# Patient Record
Sex: FEMALE | Race: BLACK OR AFRICAN AMERICAN | NOT HISPANIC OR LATINO | URBAN - METROPOLITAN AREA
[De-identification: names, ages, dates, MRNs, and addresses within clinical notes are randomized per-mention and may not be internally consistent; named-entity substitution may affect disease eponyms.]

---

## 2020-11-28 ENCOUNTER — INPATIENT (INPATIENT)
Facility: HOSPITAL | Age: 65
LOS: 5 days | Discharge: HOME | End: 2020-12-04
Attending: PSYCHIATRY & NEUROLOGY | Admitting: PSYCHIATRY & NEUROLOGY
Payer: SELF-PAY

## 2020-11-28 VITALS
SYSTOLIC BLOOD PRESSURE: 158 MMHG | TEMPERATURE: 98 F | OXYGEN SATURATION: 96 % | RESPIRATION RATE: 17 BRPM | DIASTOLIC BLOOD PRESSURE: 89 MMHG | HEART RATE: 108 BPM

## 2020-11-28 DIAGNOSIS — F29 UNSPECIFIED PSYCHOSIS NOT DUE TO A SUBSTANCE OR KNOWN PHYSIOLOGICAL CONDITION: ICD-10-CM

## 2020-11-28 LAB
ALBUMIN SERPL ELPH-MCNC: 4.1 G/DL — SIGNIFICANT CHANGE UP (ref 3.3–5)
ALP SERPL-CCNC: 103 U/L — SIGNIFICANT CHANGE UP (ref 40–120)
ALT FLD-CCNC: 14 U/L — SIGNIFICANT CHANGE UP (ref 4–33)
ANION GAP SERPL CALC-SCNC: 11 MMO/L — SIGNIFICANT CHANGE UP (ref 7–14)
APAP SERPL-MCNC: < 15 UG/ML — LOW (ref 15–25)
AST SERPL-CCNC: 26 U/L — SIGNIFICANT CHANGE UP (ref 4–32)
B PERT DNA SPEC QL NAA+PROBE: SIGNIFICANT CHANGE UP
BASOPHILS # BLD AUTO: 0.02 K/UL — SIGNIFICANT CHANGE UP (ref 0–0.2)
BASOPHILS NFR BLD AUTO: 0.4 % — SIGNIFICANT CHANGE UP (ref 0–2)
BILIRUB SERPL-MCNC: < 0.2 MG/DL — LOW (ref 0.2–1.2)
BUN SERPL-MCNC: 11 MG/DL — SIGNIFICANT CHANGE UP (ref 7–23)
C PNEUM DNA SPEC QL NAA+PROBE: SIGNIFICANT CHANGE UP
CALCIUM SERPL-MCNC: 9.3 MG/DL — SIGNIFICANT CHANGE UP (ref 8.4–10.5)
CHLORIDE SERPL-SCNC: 105 MMOL/L — SIGNIFICANT CHANGE UP (ref 98–107)
CO2 SERPL-SCNC: 25 MMOL/L — SIGNIFICANT CHANGE UP (ref 22–31)
CREAT SERPL-MCNC: 0.68 MG/DL — SIGNIFICANT CHANGE UP (ref 0.5–1.3)
EOSINOPHIL # BLD AUTO: 0.01 K/UL — SIGNIFICANT CHANGE UP (ref 0–0.5)
EOSINOPHIL NFR BLD AUTO: 0.2 % — SIGNIFICANT CHANGE UP (ref 0–6)
ETHANOL BLD-MCNC: < 10 MG/DL — SIGNIFICANT CHANGE UP
FLUAV H1 2009 PAND RNA SPEC QL NAA+PROBE: SIGNIFICANT CHANGE UP
FLUAV H1 RNA SPEC QL NAA+PROBE: SIGNIFICANT CHANGE UP
FLUAV H3 RNA SPEC QL NAA+PROBE: SIGNIFICANT CHANGE UP
FLUAV SUBTYP SPEC NAA+PROBE: SIGNIFICANT CHANGE UP
FLUBV RNA SPEC QL NAA+PROBE: SIGNIFICANT CHANGE UP
GLUCOSE SERPL-MCNC: 106 MG/DL — HIGH (ref 70–99)
HADV DNA SPEC QL NAA+PROBE: SIGNIFICANT CHANGE UP
HCOV PNL SPEC NAA+PROBE: SIGNIFICANT CHANGE UP
HCT VFR BLD CALC: 39.2 % — SIGNIFICANT CHANGE UP (ref 34.5–45)
HGB BLD-MCNC: 11.9 G/DL — SIGNIFICANT CHANGE UP (ref 11.5–15.5)
HMPV RNA SPEC QL NAA+PROBE: SIGNIFICANT CHANGE UP
HPIV1 RNA SPEC QL NAA+PROBE: SIGNIFICANT CHANGE UP
HPIV2 RNA SPEC QL NAA+PROBE: SIGNIFICANT CHANGE UP
HPIV3 RNA SPEC QL NAA+PROBE: SIGNIFICANT CHANGE UP
HPIV4 RNA SPEC QL NAA+PROBE: SIGNIFICANT CHANGE UP
IMM GRANULOCYTES NFR BLD AUTO: 0.2 % — SIGNIFICANT CHANGE UP (ref 0–1.5)
LYMPHOCYTES # BLD AUTO: 0.76 K/UL — LOW (ref 1–3.3)
LYMPHOCYTES # BLD AUTO: 14.5 % — SIGNIFICANT CHANGE UP (ref 13–44)
MCHC RBC-ENTMCNC: 26.4 PG — LOW (ref 27–34)
MCHC RBC-ENTMCNC: 30.4 % — LOW (ref 32–36)
MCV RBC AUTO: 87.1 FL — SIGNIFICANT CHANGE UP (ref 80–100)
MONOCYTES # BLD AUTO: 0.24 K/UL — SIGNIFICANT CHANGE UP (ref 0–0.9)
MONOCYTES NFR BLD AUTO: 4.6 % — SIGNIFICANT CHANGE UP (ref 2–14)
NEUTROPHILS # BLD AUTO: 4.2 K/UL — SIGNIFICANT CHANGE UP (ref 1.8–7.4)
NEUTROPHILS NFR BLD AUTO: 80.1 % — HIGH (ref 43–77)
NRBC # FLD: 0 K/UL — SIGNIFICANT CHANGE UP (ref 0–0)
PLATELET # BLD AUTO: 232 K/UL — SIGNIFICANT CHANGE UP (ref 150–400)
PMV BLD: 10.2 FL — SIGNIFICANT CHANGE UP (ref 7–13)
POTASSIUM SERPL-MCNC: 4.1 MMOL/L — SIGNIFICANT CHANGE UP (ref 3.5–5.3)
POTASSIUM SERPL-SCNC: 4.1 MMOL/L — SIGNIFICANT CHANGE UP (ref 3.5–5.3)
PROT SERPL-MCNC: 7.5 G/DL — SIGNIFICANT CHANGE UP (ref 6–8.3)
RAPID RVP RESULT: SIGNIFICANT CHANGE UP
RBC # BLD: 4.5 M/UL — SIGNIFICANT CHANGE UP (ref 3.8–5.2)
RBC # FLD: 12.5 % — SIGNIFICANT CHANGE UP (ref 10.3–14.5)
RSV RNA SPEC QL NAA+PROBE: SIGNIFICANT CHANGE UP
RV+EV RNA SPEC QL NAA+PROBE: SIGNIFICANT CHANGE UP
SALICYLATES SERPL-MCNC: < 5 MG/DL — LOW (ref 15–30)
SARS-COV-2 RNA SPEC QL NAA+PROBE: SIGNIFICANT CHANGE UP
SODIUM SERPL-SCNC: 141 MMOL/L — SIGNIFICANT CHANGE UP (ref 135–145)
TSH SERPL-MCNC: 1.89 UIU/ML — SIGNIFICANT CHANGE UP (ref 0.27–4.2)
WBC # BLD: 5.24 K/UL — SIGNIFICANT CHANGE UP (ref 3.8–10.5)
WBC # FLD AUTO: 5.24 K/UL — SIGNIFICANT CHANGE UP (ref 3.8–10.5)

## 2020-11-28 PROCEDURE — 99285 EMERGENCY DEPT VISIT HI MDM: CPT | Mod: GC

## 2020-11-28 RX ORDER — HALOPERIDOL DECANOATE 100 MG/ML
2 INJECTION INTRAMUSCULAR EVERY 6 HOURS
Refills: 0 | Status: DISCONTINUED | OUTPATIENT
Start: 2020-11-28 | End: 2020-12-04

## 2020-11-28 RX ORDER — HALOPERIDOL DECANOATE 100 MG/ML
5 INJECTION INTRAMUSCULAR ONCE
Refills: 0 | Status: COMPLETED | OUTPATIENT
Start: 2020-11-28 | End: 2020-11-28

## 2020-11-28 RX ORDER — RISPERIDONE 4 MG/1
1 TABLET ORAL AT BEDTIME
Refills: 0 | Status: DISCONTINUED | OUTPATIENT
Start: 2020-11-28 | End: 2020-12-04

## 2020-11-28 RX ORDER — HALOPERIDOL DECANOATE 100 MG/ML
2 INJECTION INTRAMUSCULAR ONCE
Refills: 0 | Status: DISCONTINUED | OUTPATIENT
Start: 2020-11-28 | End: 2020-12-04

## 2020-11-28 RX ADMIN — Medication 2 MILLIGRAM(S): at 12:26

## 2020-11-28 RX ADMIN — HALOPERIDOL DECANOATE 5 MILLIGRAM(S): 100 INJECTION INTRAMUSCULAR at 12:26

## 2020-11-28 NOTE — ED PROVIDER NOTE - PROGRESS NOTE DETAILS
spoke to brother christiano, pt has long term issues, lost a prior apartment 2' paranoid concerns of "things coming up through the floor"., making accusations of people poisoning her, was evicted through the court bc she was a danger to the family she was living with. she was hospitalized about 15 years ago at [facility] on a 72 hour hold for SI. the family has been trying to get her help outside, but her behavior has been very erratic.

## 2020-11-28 NOTE — ED BEHAVIORAL HEALTH ASSESSMENT NOTE - SUICIDE PROTECTIVE FACTORS
Fear of death or the actual act of killing self/Cultural, spiritual and/or moral attitudes against suicide/Hoahaoism beliefs

## 2020-11-28 NOTE — ED ADULT NURSE NOTE - OBJECTIVE STATEMENT
Received pt in  pt bought in by EMS / [employer]  for paranoia, pt denies si/hi/vh presently safety & comfort measures maintained eval on going.

## 2020-11-28 NOTE — ED PROVIDER NOTE - CLINICAL SUMMARY MEDICAL DECISION MAKING FREE TEXT BOX
alcides: pt presents with reported c/o suicidality in pt with reported hx of psych admission, but she denies all of this.  she is agitated in the company of nypd and ems.  we need additional collateral to help determine concerns and validity.  psych social worker and psychiatrist given phone number of neighbor.  dispo as per psych attending.

## 2020-11-28 NOTE — ED BEHAVIORAL HEALTH ASSESSMENT NOTE - DESCRIPTION
Pt irritable, paranoid, did not require emergent PRN medications.    Vital Signs Last 24 Hrs  T(C): 36.8 (28 Nov 2020 09:42), Max: 36.8 (28 Nov 2020 09:42)  T(F): 98.3 (28 Nov 2020 09:42), Max: 98.3 (28 Nov 2020 09:42)  HR: 108 (28 Nov 2020 09:42) (108 - 108)  BP: 158/89 (28 Nov 2020 09:42) (158/89 - 158/89)  BP(mean): --  RR: 17 (28 Nov 2020 09:42) (17 - 17)  SpO2: 96% (28 Nov 2020 09:42) (96% - 96%) None known Never , raised in [place], [employer], former  however out of work for several years

## 2020-11-28 NOTE — ED PROVIDER NOTE - OBJECTIVE STATEMENT
alcides: 64 yo woman brought in by EMS and [employer] bc her neighbor (shreya) called them as she was stating she was suicidal.  pt here in [facility] angry, handcuffed.  she states she never stated she was suicidal, that nypd are lying and EMS are lying.  She was aggressive in the apartment as per nypd, EMS came to the scene once she was out of the apartment.  as per the neighbor she has a psych history and has been hospitalized before.  in our medical records she was last hear in 2015 for a minor head injury.  no other history in the chart.  she denies taking medicine, she denies having any medical problems.  she "invoked (my) right to privacy" when asked if she was ever hospitalized in a psych hospital.  pt is able to communicate clearly, but mildly agitated and angry.      denies N/V/abd pain/ HA/ fever/ chest pain/ SOB/ dysuria/ urgency/ extremity issue (other than handcuffs)

## 2020-11-28 NOTE — ED PROVIDER NOTE - PHYSICAL EXAMINATION
pt alert and can phonate well  h at/nc  perrl, conj clear, sclera anicteric,  neck supple  cor rrr pos s1s2  lungs clear to asno wheeze  abd soft no r/g/t  ext no edema no deformities  neueo awake, lucid normal gait moves all extremities with strength  vs bp mildly elevated at 158/  psych angry, agitated mildly, guarded, refusing to answer some questions

## 2020-11-28 NOTE — ED BEHAVIORAL HEALTH ASSESSMENT NOTE - HPI (INCLUDE ILLNESS QUALITY, SEVERITY, DURATION, TIMING, CONTEXT, MODIFYING FACTORS, ASSOCIATED SIGNS AND SYMPTOMS)
Pt is a 64 YO F domiciled alone in apartment in [place], no children, history of psychiatric ED hold 15 years ago for suicidal gesture, no other known psychiatric history, who presented to ED BIB [facility] for evaluation of suicidal statements made near apartment.  Pt notably antagonistic and repeatedly stated "I have my rights."  She states that she went to see her neighbor earlier this morning.  While the two were in the hallway the police arrived due to reports of a concerning male who was in the hallway.  Pt denied seeing any male.  Several minutes later, police allegedly changed report to call related to suicidality.  Police were informed that pt had made a suicidal statement.  While friend was trying to deny any suicidal statements, police nonetheless brought pt into their custody and brought to ED.  Pt currently denying making any suicidal statements, adding that she had been wrongfully accused.  When asked about prior suicidality or psychiatric hospitalizations she responded, "my medical record is protected by HIPAA."      Pt reports she has undergone a prolonged legal magaña with current landlady who has evicted pt.  Pt believes this was related to complaints raised about "pesticides" being pumped into pt's apartment by neighbors.  Also adds that neighbors have been putting excessive disinfectant through cracks in her apartment.  Pt has opened windows to properly ventilate apartment despite complaints from landlady.  Pt has also heard landlady call pt "loco" over the phone but has not witnessed this in person.  Pt first received eviction notice back in September but has been fighting it.  She adds that "an  couple" came by her apartment recently and pt was concerned that [employer] would be selling the apartment.  Pt denies however that she can be evicted. Pt denies feeling depressed currently.  She denies feeling suicidal or homicidal.  She denies current AVH.      Collateral obtained from neighbor who called 911, Mr. Amato: Reports pt has not been living at this residence for several years.  Around 9 AM he heard pt banging on their door, pt was frantic stating that people were chasing her, that the police were after her however no police were present.  Mr. Amato called pt's brother, who confirmed that pt has long history of paranoia and prior hospitalizations, had been taking "some medication," but unclear if ever successfully engaged in outpatient treatment.  Pt's brother advised Mr. Amato to not let pt into their apartment because she likely was looking for a place to stay.  Mr. Amato then called 911.    Collateral obtained additionally from brother Radu ([phone])  Brother states pt has been evicted recently from place she had been residing in for past 30+ days.  She has been spraying chemicals, repeatedly stated that someone was out to get her, has also opened windows despite protests from tenants out of concerns for contamination from chemicals.  Brother believes pt is looking for place to stay and will likely harass former neighbors if discharged.   He believes pt is emotionally unstable and strongly recommends psychiatric hospitalization.  Brother confirms that pt had stayed in prior apartment for 38 years but was ultimately evicted due to behaviors related to her paranoia as described above.  Brother adds that mental health professionals have routinely evaluated pt when at home but typically have not been able to make a case for involuntary psychiatric admission as she typically is able to stay calm and cooperative when necessary. Brother reports pt has made suicidal statements in past.

## 2020-11-28 NOTE — ED BEHAVIORAL HEALTH ASSESSMENT NOTE - AXIS IV
Problems with primary support/Occupational problems/Problem related to social environment/Housing problems/Problems with interaction with legal system

## 2020-11-28 NOTE — ED BEHAVIORAL HEALTH ASSESSMENT NOTE - SUMMARY
Pt is a 64 YO F domiciled alone in apartment in [place], no children, history of psychiatric ED hold 15 years ago for suicidal gesture, no other known psychiatric history, who presented to ED BIB [facility] for evaluation of suicidal statements made near apartment.  While suicidal statements cannot be concerned, pt has been harassing former neighbors after being evicted recently as a result of delusions that her apartment has been sprayed with chemicals.  She has also made numerous paranoid statements that raise concern for likely continued harassment of others without inpatient psychiatric treatment.  Due to current destabilizing factors and lack of current treatment engagement, pt warrants emergent psychiatric hospitalization for symptom stabilization.

## 2020-11-28 NOTE — ED ADULT TRIAGE NOTE - CHIEF COMPLAINT QUOTE
EMS and [employer] brings in patient after they had gotten a call stating pt has made SI statements to her. pt denies SI, became aggressive when EMS arrived, pt currently in handcuffs, not arrested. denies si/hi/ah/vh.

## 2020-11-28 NOTE — ED PROVIDER NOTE - DISCUSSED CASE WITH MULTISELECT OPTIONS
no pain/no vomiting/no chills/no decreased eating/drinking/no dizziness/no tingling/no weakness/no nausea
Consultant

## 2020-11-28 NOTE — ED BEHAVIORAL HEALTH NOTE - BEHAVIORAL HEALTH NOTE
COVID Exposure Screen- Patient     1.        *Have you had a COVID-19 test in the last 21 days?  (  ) Yes   (X) No   (  ) Unknown- Reason: ______    IF YES PROCEED TO QUESTION #2. IF NO OR UNKNOWN THEN PLEASE SKIP TO QUESTION #3.    2.        Date of test: ________    3.        3. Do you know the result? (  ) Negative   (  ) Positive   (  ) No result available    4.        *In the past 14 days, have you been around anyone with a positive COVID-19 test?*    (  ) Yes   (X ) No   (  ) Unknown- Reason (e.g. patient uncertain, sedated, refusing to answer, etc.):  ______    IF YES PROCEED TO QUESTION #5. IF NO or UNKNOWN, PLEASE SKIP TO QUESTION #10    5.        Were you within 6 feet of them for at least 15 minutes? (  ) Yes   (  ) No   (  ) Unknown- Reason: _____    6.        Have you provided care for them? (  ) Yes   (  ) No   (  ) Unknown- Reason: ______    7.        Have you had direct physical contact with them (touched, hugged, or kissed them)? (  ) Yes   (  ) No    (  ) Unknown- Reason: ___    8.        Have you shared eating or drinking utensils with them? (  ) Yes   (  ) No    (  ) Unknown- Reason: ____    9.        Have they sneezed, coughed, or somehow got respiratory droplets on you? (  ) Yes   (  ) No    (  ) Unknown- Reason: ______    10.     *Have you been out of [place] within the past 14 days?*    (  ) Yes   (X ) No   (  ) Unknown- Reason (e.g. patient uncertain, sedated, refusing to answer, etc.): _______

## 2020-11-28 NOTE — ED ADULT NURSE REASSESSMENT NOTE - NS ED NURSE REASSESS COMMENT FT1
pt calm & cooperative denies si/hi/avh presently, pt cleared by Psych for admission to  transported via EMS.

## 2020-11-28 NOTE — ED BEHAVIORAL HEALTH ASSESSMENT NOTE - CASE SUMMARY
66 YO F domiciled alone in apartment in [place], no children, history of psychiatric ED hold 15 years ago for suicidal gesture, no other known psychiatric history, who presented to ED [facility] for evaluation of suicidal statements made near apartment, she denies any SI but the pt has been harassing former neighbors after being evicted recently as a result of delusions that her apartment has been sprayed with chemicals which appears secondary to persecutory paranoia, so sever she is unable to live in her home and has been wandering in public and was BIB EMS + police, and will require involuntary psychiatric hospitalization for symptom stabilization.  Plan: invol 9.39 admission [facility] 2w, medical clearance, lorazepam 1mg + Haldol 2mg IM, start risperidone 1mg QHS

## 2020-11-28 NOTE — ED BEHAVIORAL HEALTH ASSESSMENT NOTE - ACTIVATING EVENTS/STRESSORS
Current or pending social isolation/Triggering events leading to humiliation, shame, and/or despair (e.g. Loss of relationship, financial or health status) (real or anticipated)/Pending incarceration or homelessness/Inadequate social supports/Non-compliant or not receiving treatment

## 2020-11-28 NOTE — ED BEHAVIORAL HEALTH ASSESSMENT NOTE - OTHER PAST PSYCHIATRIC HISTORY (INCLUDE DETAILS REGARDING ONSET, COURSE OF ILLNESS, INPATIENT/OUTPATIENT TREATMENT)
Long history of paranoia, prior psychiatric evals, at least one 72-hour hold 15 years ago, no information per PSYCKES

## 2020-11-28 NOTE — ED BEHAVIORAL HEALTH ASSESSMENT NOTE - RISK ASSESSMENT
Modifiable risk factors include impaired reality testing with persecutory delusions, possible olfactory hallucinations of chemicals in her apartment, lack of insight into need for treatment, isolation, pending homelessness, currently not employed.  Unmodifiable risk factors include likely history of long-standing psychotic illness, history of poor relationship functioning, history of suicidal statements and prior psychiatric evaluations, history of treatment noncompliance.  Protective factors include lack of current SI, no prior suicide attempts, no significant legal history, no prior substance abuse, Quaker beliefs, no medical problems. Low Acute Suicide Risk

## 2020-11-28 NOTE — ED BEHAVIORAL HEALTH ASSESSMENT NOTE - VIOLENCE RISK FACTORS:
Community stressors that increase the risk of destabilization/Lack of insight into violence risk/need for treatment/Noncompliance with treatment/Irritability

## 2020-11-29 LAB
CHOLEST SERPL-MCNC: 205 MG/DL — HIGH (ref 120–199)
DIRECT LDL: 95 MG/DL — SIGNIFICANT CHANGE UP
HDLC SERPL-MCNC: 104 MG/DL — HIGH (ref 45–65)
TRIGL SERPL-MCNC: 53 MG/DL — SIGNIFICANT CHANGE UP (ref 10–149)

## 2020-11-29 PROCEDURE — 99222 1ST HOSP IP/OBS MODERATE 55: CPT

## 2020-11-30 PROCEDURE — 99232 SBSQ HOSP IP/OBS MODERATE 35: CPT

## 2020-11-30 RX ORDER — ACETAMINOPHEN 500 MG
650 TABLET ORAL ONCE
Refills: 0 | Status: COMPLETED | OUTPATIENT
Start: 2020-11-30 | End: 2020-11-30

## 2020-11-30 RX ADMIN — Medication 650 MILLIGRAM(S): at 22:04

## 2020-12-01 PROCEDURE — 99232 SBSQ HOSP IP/OBS MODERATE 35: CPT

## 2020-12-02 PROCEDURE — 99232 SBSQ HOSP IP/OBS MODERATE 35: CPT

## 2020-12-03 PROCEDURE — 99232 SBSQ HOSP IP/OBS MODERATE 35: CPT

## 2020-12-03 RX ORDER — RISPERIDONE 4 MG/1
1 TABLET ORAL
Qty: 30 | Refills: 0
Start: 2020-12-03 | End: 2021-01-01

## 2020-12-04 VITALS — TEMPERATURE: 98 F

## 2020-12-04 PROCEDURE — 99232 SBSQ HOSP IP/OBS MODERATE 35: CPT

## 2020-12-22 NOTE — SOCIAL WORK POST DISCHARGE FOLLOW UP NOTE - NSBHSWFOLLOWUP_PSY_ALL_CORE_FT
Prior to d/c, pt verbalized to SW that she did not want to provide [facility]/SW with her phone number, stating her information is protected by law. SW still does not have a phone number to contact pt. At the time of d/c, pt was not a danger to herself or others. SW to send letter home for f/u. The case has now been closed.

## 2025-06-28 NOTE — ED BEHAVIORAL HEALTH ASSESSMENT NOTE - NS ED [facility] MED ROS GASTROINTESTINAL
06/27/25 1953   Patient Assessment/Suction   Level of Consciousness (AVPU) alert   Respiratory Effort Normal;Unlabored   Expansion/Accessory Muscles/Retractions no use of accessory muscles;no retractions   All Lung Fields Breath Sounds diminished   Rhythm/Pattern, Respiratory unlabored;pattern regular;no shortness of breath reported   Cough Frequency infrequent   Skin Integrity   $ Wound Care Tech Time 15 min   Area Observed Left;Cheek;Behind ear;Right;Nares   Skin Appearance without discoloration   PRE-TX-O2   Device (Oxygen Therapy) nasal cannula   $ Is the patient on Low Flow Oxygen? Yes   Flow (L/min) (Oxygen Therapy) 2   SpO2 (!) 94 %   Pulse Oximetry Type Intermittent   Pulse 84   Resp 18   Aerosol Therapy   $ Aerosol Therapy Charges Aerosol Treatment  (brov)   Daily Review of Necessity (SVN) completed   Respiratory Treatment Status (SVN) given   Treatment Route (SVN) oxygen;mask   Patient Position HOB elevated   Post Treatment Assessment (SVN) increased aeration   Signs of Intolerance (SVN) none   Breath Sounds Post-Respiratory Treatment   Throughout All Fields Post-Treatment All Fields   Throughout All Fields Post-Treatment aeration increased   Post-treatment Heart Rate (beats/min) 84   Post-treatment Resp Rate (breaths/min) 18       
   06/28/25 0639   Patient Assessment/Suction   Level of Consciousness (AVPU) alert   Respiratory Effort Unlabored;Normal   Expansion/Accessory Muscles/Retractions no use of accessory muscles;no retractions   All Lung Fields Breath Sounds Anterior:;Lateral:;diminished   Rhythm/Pattern, Respiratory no shortness of breath reported;pattern regular;unlabored   Cough Frequency infrequent   Cough Type dry;good;nonproductive   Skin Integrity   $ Wound Care Tech Time 15 min   Area Observed Left;Right;Behind ear;Cheek;Upper lip;Nares   Skin Appearance without discoloration   PRE-TX-O2   Device (Oxygen Therapy) nasal cannula  (pt was on room air, placed back on NC)   $ Is the patient on Low Flow Oxygen? Yes   Flow (L/min) (Oxygen Therapy) 2   SpO2 (!) 92 %   Pulse Oximetry Type Intermittent   $ Pulse Oximetry - Multiple Charge Pulse Oximetry - Multiple   Pulse 76   Resp 18   Aerosol Therapy   $ Aerosol Therapy Charges Aerosol Treatment  (PULMICORT)   Daily Review of Necessity (SVN) completed   Respiratory Treatment Status (SVN) given   Treatment Route (SVN) mask;oxygen   Patient Position semi-Goncalves's   Post Treatment Assessment (SVN) increased aeration;breath sounds improved   Signs of Intolerance (SVN) none   Breath Sounds Post-Respiratory Treatment   Throughout All Fields Post-Treatment All Fields   Throughout All Fields Post-Treatment aeration increased   Post-treatment Heart Rate (beats/min) 75   Post-treatment Resp Rate (breaths/min) 18   Education   $ Education Bronchodilator;Oxygen;15 min       
No complaints